# Patient Record
Sex: MALE | Race: BLACK OR AFRICAN AMERICAN | NOT HISPANIC OR LATINO | Employment: UNEMPLOYED | URBAN - METROPOLITAN AREA
[De-identification: names, ages, dates, MRNs, and addresses within clinical notes are randomized per-mention and may not be internally consistent; named-entity substitution may affect disease eponyms.]

---

## 2017-05-28 ENCOUNTER — HOSPITAL ENCOUNTER (EMERGENCY)
Facility: CLINIC | Age: 52
Discharge: HOME | End: 2017-05-28
Attending: PSYCHIATRY & NEUROLOGY | Admitting: PSYCHIATRY & NEUROLOGY
Payer: COMMERCIAL

## 2017-05-28 VITALS
TEMPERATURE: 97.4 F | OXYGEN SATURATION: 98 % | SYSTOLIC BLOOD PRESSURE: 151 MMHG | DIASTOLIC BLOOD PRESSURE: 104 MMHG | HEART RATE: 93 BPM | RESPIRATION RATE: 16 BRPM

## 2017-05-28 DIAGNOSIS — F19.94 OTHER PSYCHOACTIVE SUBSTANCE-INDUCED MOOD DISORDER (H): ICD-10-CM

## 2017-05-28 DIAGNOSIS — F19.20 CHEMICAL DEPENDENCY (H): ICD-10-CM

## 2017-05-28 LAB
AMPHETAMINES UR QL SCN: ABNORMAL
BARBITURATES UR QL: ABNORMAL
BENZODIAZ UR QL: ABNORMAL
CANNABINOIDS UR QL SCN: ABNORMAL
COCAINE UR QL: ABNORMAL
ETHANOL UR QL SCN: ABNORMAL
OPIATES UR QL SCN: ABNORMAL

## 2017-05-28 PROCEDURE — 99283 EMERGENCY DEPT VISIT LOW MDM: CPT | Mod: Z6 | Performed by: PSYCHIATRY & NEUROLOGY

## 2017-05-28 PROCEDURE — 80307 DRUG TEST PRSMV CHEM ANLYZR: CPT | Performed by: FAMILY MEDICINE

## 2017-05-28 PROCEDURE — 99285 EMERGENCY DEPT VISIT HI MDM: CPT | Mod: 25

## 2017-05-28 PROCEDURE — 90791 PSYCH DIAGNOSTIC EVALUATION: CPT

## 2017-05-28 PROCEDURE — 80320 DRUG SCREEN QUANTALCOHOLS: CPT | Performed by: FAMILY MEDICINE

## 2017-05-28 ASSESSMENT — ENCOUNTER SYMPTOMS
CARDIOVASCULAR NEGATIVE: 1
DECREASED CONCENTRATION: 1
HALLUCINATIONS: 0
NEUROLOGICAL NEGATIVE: 1
HYPERACTIVE: 0
GASTROINTESTINAL NEGATIVE: 1
RESPIRATORY NEGATIVE: 1
HEMATOLOGIC/LYMPHATIC NEGATIVE: 1
CONSTITUTIONAL NEGATIVE: 1
ENDOCRINE NEGATIVE: 1
MUSCULOSKELETAL NEGATIVE: 1
SLEEP DISTURBANCE: 1
EYES NEGATIVE: 1

## 2017-05-28 NOTE — DISCHARGE INSTRUCTIONS
You need long-term MI/CD treatment.  Work with your  on getting into a program in [facility]. [facility]  Follow-up established care and services

## 2017-05-28 NOTE — ED AVS SNAPSHOT
[facility], [place], Emergency Department    [address]    [facility] [address]    Phone:  [phone]    Fax:  [phone]                                       Eusebio Morales   MRN: [id number]    Department:  [facility], Emergency Department   Date of Visit:  5/28/2017           After Visit Summary Signature Page     I have received my discharge instructions, and my questions have been answered. I have discussed any challenges I see with this plan with the nurse or doctor.    ..........................................................................................................................................  Patient/Patient Representative Signature      ..........................................................................................................................................  Patient Representative Print Name and Relationship to Patient    ..................................................               ................................................  Date                                            Time    ..........................................................................................................................................  Reviewed by Signature/Title    ...................................................              ..............................................  Date                                                            Time

## 2017-05-28 NOTE — ED NOTES
"Pt says he has been feeling increasingly depressed since this past [place]. Has [facility] and [facility] but stopped taking them b/c \"they don't work\"; has not taken any meds for over 3 months. Pt says he needs narcotics but no one will prescribe them to him, so he street buys percocet or oxy's; last use was 2 weeks ago. Pt states he is having an especially hard time this weekend since it is Memorial day and he is a . Pt used cocaine and alcohol last Friday. Pt says he took the train here today from [place] and he has been walking around all day deciding whether or not to come to ED. Reports SI with \"a lot of ideas.\" Pt is flat, withdrawn, cooperative.   "

## 2017-05-28 NOTE — ED PROVIDER NOTES
History     Chief Complaint   Patient presents with     Suicidal     pt says he is a  and having a hard time this weekend, SI no plan     Depression     Patient is a 51 year old male presenting with Depression:. The history is provided by the patient and medical records.   Depression       Eusebio Morales is a 51 year old male who is here reporting that he feels depressed and suicidal. Patient resides in [facility]. He reports a friend put him on a bus and had him come here. He reports being a  but does not like [facility]. [facility]. He has a  who told him to get help. He reports psychotropic meds don't help him. He stopped taking them 3 months ago. Allegedly his meds are set up for him. He feels narcotics and street drugs help him and he continues to use. His last cocaine use was 2 days ago. He does not feel he has a problem with drugs and does not need an intervention. He dismisses the correlation between drug use and depression. His visits here and subsequent admission have been consistently correlated with a positive drug screen, notably cocaine. Patient has refused to follow-up with treatment recommendations.     PERSONAL MEDICAL HISTORY  Past Medical History:   Diagnosis Date     Anxiety      Gastro-oesophageal reflux disease      Hyperlipidemia      Hypertension      MDD (major depressive disorder)      Polysubstance abuse     Cocaine, Alcohol     PAST SURGICAL HISTORY  Past Surgical History:   Procedure Laterality Date     NO HISTORY OF SURGERY       FAMILY HISTORY  Family History   Problem Relation Age of Onset     Hypertension Mother      Hypertension Father      SOCIAL HISTORY  Social History   Substance Use Topics     Smoking status: Current Every Day Smoker     Packs/day: 0.50     Smokeless tobacco: Not on file     Alcohol use Yes      Comment: occasional use     MEDICATIONS  No current facility-administered medications for this  encounter.      Current Outpatient Prescriptions   Medication     albuterol (PROAIR HFA, PROVENTIL HFA, VENTOLIN HFA) 108 (90 BASE) MCG/ACT inhaler     mirtazapine (REMERON) 30 MG tablet     lisinopril (PRINIVIL,ZESTRIL) 20 MG tablet     QUEtiapine (SEROQUEL) 50 MG tablet     propranolol (INDERAL) 20 MG tablet     nicotine polacrilex (COMMIT) 4 MG lozenge     omeprazole (PRILOSEC OTC) 20 MG tablet     aspirin 81 MG chewable tablet     naproxen (NAPROSYN) 500 MG tablet     hydrOXYzine (ATARAX) 50 MG tablet     traZODone (DESYREL) 100 MG tablet     atorvastatin (LIPITOR) 10 MG tablet     benztropine (COGENTIN) 0.5 MG tablet     cyclobenzaprine (FLEXERIL) 10 MG tablet     QUEtiapine (SEROQUEL) 200 MG tablet     multivitamin, therapeutic with minerals (THERA-VIT-M) TABS     ALLERGIES  No Known Allergies    I have reviewed the Medications, Allergies, Past Medical and Surgical History, and Social History in the Epic system.    Review of Systems   Constitutional: Negative.    HENT: Negative.    Eyes: Negative.    Respiratory: Negative.    Cardiovascular: Negative.    Gastrointestinal: Negative.    Endocrine: Negative.    Genitourinary: Negative.    Musculoskeletal: Negative.    Skin: Negative.    Neurological: Negative.    Hematological: Negative.    Psychiatric/Behavioral: Positive for decreased concentration, sleep disturbance and suicidal ideas. Negative for hallucinations. The patient is not hyperactive.    All other systems reviewed and are negative.      Physical Exam   BP: (!) 151/104  Pulse: 93  Temp: 97.4  F (36.3  C)  Resp: 16  SpO2: 98 %  Physical Exam   Constitutional: He appears well-developed and well-nourished.   Pulmonary/Chest: Effort normal.   Musculoskeletal: Normal range of motion.   Neurological: He is alert.   Psychiatric: His speech is normal. Judgment normal. He is withdrawn. He is not agitated, not aggressive, not hyperactive, not actively hallucinating and not combative. Thought content is not  paranoid and not delusional. Cognition and memory are normal. He exhibits a depressed mood. He expresses no homicidal and no suicidal ideation.   Nursing note and vitals reviewed.      ED Course     ED Course     Procedures      Labs Ordered and Resulted from Time of ED Arrival Up to the Time of Departure from the ED   DRUG ABUSE SCREEN 6 CHEM DEP URINE ([facility]) - Abnormal; Notable for the following:        Result Value    Cocaine Qual Urine   (*)     Value: Positive   Cutoff for a positive cocaine is greater than 300 ng/mL. This is an unconfirmed   screening result to be used for medical purposes only.      All other components within normal limits            Assessments & Plan (with Medical Decision Making)   Patient with history of depression and chemical dependency. Patient has had many hospitalizations here but does not follow-up with recommendations. He refuses to acknowledge a drug problem. I recommended MI/CD day treatment. He was expecting an admission and when he realized he was not getting what he wanted, he asked to leave as we were not willing to help him. Patient was allowed to leave. I recommended MI/CD day treatment programming.    I have reviewed the nursing notes.    I have reviewed the findings, diagnosis, plan and need for follow up with the patient.    New Prescriptions    No medications on file       Final diagnoses:   Chemical dependency (H)   Other psychoactive substance-induced mood disorder (H)       5/28/2017   [facility], [place], EMERGENCY DEPARTMENT     Chris Singh MD  05/28/17 5296

## 2017-05-28 NOTE — ED AVS SNAPSHOT
[facility], Emergency Department    [address]    [place] [address]    Phone:  [phone]    Fax:  [phone]                                       Eusebio Morales   MRN: [id number]    Department:  [facility], Emergency Department   Date of Visit:  5/28/2017           Patient Information     Date Of Birth          1965        Your diagnoses for this visit were:     Chemical dependency (H)     Other psychoactive substance-induced mood disorder (H)        You were seen by Chris Singh MD.      Follow-up Information     Follow up with Bassem Dugan LPN.        Discharge Instructions       You need long-term MI/CD treatment.  Work with your  on getting into a program in [facility]. [facility]  Follow-up [facility] care and services    24 Hour Appointment Hotline       To make an appointment at any [facility], call [phone] ([phone]). If you don't have a family doctor or clinic, we will help you find one. [place] clinics are conveniently located to serve the needs of you and your family.             Review of your medicines      Our records show that you are taking the medicines listed below. If these are incorrect, please call your family doctor or clinic.        Dose / Directions Last dose taken    albuterol 108 (90 BASE) MCG/ACT Inhaler   Commonly known as:  PROAIR HFA/PROVENTIL HFA/VENTOLIN HFA   Dose:  2 puff        Inhale 2 puffs into the lungs every 6 hours as needed for wheezing   Refills:  0        aspirin 81 MG chewable tablet   Dose:  81 mg        Take 1 tablet (81 mg) by mouth daily   Refills:  0        atorvastatin 10 MG tablet   Commonly known as:  LIPITOR        Take 1 tab by mouth every evening   Refills:  0        benztropine 0.5 MG tablet   Commonly known as:  COGENTIN   Dose:  0.5 mg        Take 1 tablet (0.5 mg) by mouth 2 times daily as needed (EPSE)   Refills:  0        cyclobenzaprine 10 MG tablet   Commonly known  as:  FLEXERIL   Dose:  10 mg        Take 1 tablet (10 mg) by mouth nightly as needed for muscle spasms   Refills:  0        hydrOXYzine 50 MG tablet   Commonly known as:  ATARAX   Dose:   mg        Take 1-2 tablets ( mg) by mouth every 4 hours as needed for anxiety   Refills:  0        lisinopril 20 MG tablet   Commonly known as:  PRINIVIL/ZESTRIL   Dose:  20 mg   Quantity:  30 tablet        Take 1 tablet (20 mg) by mouth daily   Refills:  1        mirtazapine 30 MG tablet   Commonly known as:  REMERON   Dose:  30 mg   Quantity:  30 tablet        Take 1 tablet (30 mg) by mouth At Bedtime   Refills:  1        multivitamin, therapeutic with minerals Tabs tablet   Dose:  1 tablet   Quantity:  30 each        Take 1 tablet by mouth daily   Refills:  0        naproxen 500 MG tablet   Commonly known as:  NAPROSYN   Dose:  500 mg        Take 1 tablet (500 mg) by mouth 2 times daily as needed for moderate pain   Refills:  0        nicotine polacrilex 4 MG lozenge   Dose:  4 mg   Quantity:  360 tablet        Place 1 lozenge (4 mg) inside cheek every hour as needed for smoking cessation   Refills:  1        omeprazole 20 MG tablet   Commonly known as:  priLOSEC OTC   Dose:  20 mg   Quantity:  30 tablet        Take 1 tablet (20 mg) by mouth 2 times daily   Refills:  1        propranolol 20 MG tablet   Commonly known as:  INDERAL   Dose:  20 mg   Quantity:  90 tablet        Take 1 tablet (20 mg) by mouth 3 times daily   Refills:  1        * QUEtiapine 200 MG tablet   Commonly known as:  SEROquel   Dose:  200 mg   Quantity:  120 tablet        Take 1 tablet (200 mg) by mouth At Bedtime   Refills:  0        * QUEtiapine 50 MG tablet   Commonly known as:  SEROquel   Dose:   mg   Quantity:  120 tablet        Take 1-2 tablets ( mg) by mouth 3 times daily as needed (anxiety)   Refills:  1        traZODone 100 MG tablet   Commonly known as:  DESYREL   Dose:  100 mg        Take 1 tablet (100 mg) by mouth nightly  "as needed for sleep   Refills:  0        * Notice:  This list has 2 medication(s) that are the same as other medications prescribed for you. Read the directions carefully, and ask your doctor or other care provider to review them with you.            Procedures and tests performed during your visit     Drug abuse screen 6 urine (tox)      Orders Needing Specimen Collection     None      Pending Results     No orders found from 2017 to 2017.            Pending Culture Results     No orders found from 2017 to 2017.            Pending Results Instructions     If you had any lab results that were not finalized at the time of your Discharge, you can call the ED Lab Result RN at [phone]. You will be contacted by this team for any positive Lab results or changes in treatment. The nurses are available 7 days a week from 10A to 6:30P.  You can leave a message 24 hours per day and they will return your call.        Thank you for choosing [facility]       Thank you for choosing [facility] for your care. Our goal is always to provide you with excellent care. Hearing back from our patients is one way we can continue to improve our services. Please take a few minutes to complete the written survey that you may receive in the mail after you visit with us. Thank you!        [employer] Information     [employer] lets you send messages to your doctor, view your test results, renew your prescriptions, schedule appointments and more. To sign up, go to [email or web address] . Click on \"Log in\" on the left side of the screen, which will take you to the Welcome page. Then click on \"Sign up Now\" on the right side of the page.     You will be asked to enter the access code listed below, as well as some personal information. Please follow the directions to create your username and password.     Your access code is: [email or web address]  Expires: 2017  4:31 PM     Your access code will  in 90 days. If you need help or a " new code, please call your [place] clinic or [phone].        Care EveryWhere ID     This is your Care EveryWhere ID. This could be used by other organizations to access your [place] medical records  [id number]        After Visit Summary       This is your record. Keep this with you and show to your community pharmacist(s) and doctor(s) at your next visit.

## 2022-06-20 ENCOUNTER — HOSPITAL ENCOUNTER (INPATIENT)
Facility: CLINIC | Age: 57
LOS: 1 days | Discharge: HOME | DRG: 885 | End: 2022-06-22
Attending: EMERGENCY MEDICINE | Admitting: PSYCHIATRY & NEUROLOGY
Payer: COMMERCIAL

## 2022-06-20 ENCOUNTER — TELEPHONE (OUTPATIENT)
Dept: BEHAVIORAL HEALTH | Facility: CLINIC | Age: 57
End: 2022-06-20

## 2022-06-20 DIAGNOSIS — R52 PAIN: ICD-10-CM

## 2022-06-20 DIAGNOSIS — R45.851 SUICIDAL IDEATION: ICD-10-CM

## 2022-06-20 DIAGNOSIS — F43.21 ADJUSTMENT DISORDER WITH DEPRESSED MOOD: ICD-10-CM

## 2022-06-20 DIAGNOSIS — G47.00 INSOMNIA, UNSPECIFIED TYPE: ICD-10-CM

## 2022-06-20 DIAGNOSIS — I10 ESSENTIAL HYPERTENSION: ICD-10-CM

## 2022-06-20 DIAGNOSIS — R06.02 SHORTNESS OF BREATH: Primary | ICD-10-CM

## 2022-06-20 DIAGNOSIS — F19.90 SUBSTANCE USE DISORDER: ICD-10-CM

## 2022-06-20 DIAGNOSIS — F33.3 SEVERE RECURRENT MAJOR DEPRESSIVE DISORDER WITH PSYCHOTIC FEATURES (H): ICD-10-CM

## 2022-06-20 DIAGNOSIS — F41.9 ANXIETY: ICD-10-CM

## 2022-06-20 DIAGNOSIS — Z11.52 ENCOUNTER FOR SCREENING LABORATORY TESTING FOR SEVERE ACUTE RESPIRATORY SYNDROME CORONAVIRUS 2 (SARS-COV-2): ICD-10-CM

## 2022-06-20 LAB
ALCOHOL BREATH TEST: 0 (ref 0–0.01)
AMPHETAMINES UR QL SCN: ABNORMAL
ATRIAL RATE - MUSE: 122 BPM
BARBITURATES UR QL: ABNORMAL
BENZODIAZ UR QL: ABNORMAL
CANNABINOIDS UR QL SCN: ABNORMAL
COCAINE UR QL: ABNORMAL
DIASTOLIC BLOOD PRESSURE - MUSE: NORMAL MMHG
INTERPRETATION ECG - MUSE: NORMAL
OPIATES UR QL SCN: ABNORMAL
P AXIS - MUSE: 65 DEGREES
PR INTERVAL - MUSE: 126 MS
QRS DURATION - MUSE: 72 MS
QT - MUSE: 336 MS
QTC - MUSE: 478 MS
R AXIS - MUSE: 17 DEGREES
SARS-COV-2 RNA RESP QL NAA+PROBE: NEGATIVE
SYSTOLIC BLOOD PRESSURE - MUSE: NORMAL MMHG
T AXIS - MUSE: 0 DEGREES
VENTRICULAR RATE- MUSE: 122 BPM

## 2022-06-20 PROCEDURE — 93005 ELECTROCARDIOGRAM TRACING: CPT | Performed by: EMERGENCY MEDICINE

## 2022-06-20 PROCEDURE — 99285 EMERGENCY DEPT VISIT HI MDM: CPT | Mod: 25 | Performed by: EMERGENCY MEDICINE

## 2022-06-20 PROCEDURE — 250N000013 HC RX MED GY IP 250 OP 250 PS 637: Performed by: EMERGENCY MEDICINE

## 2022-06-20 PROCEDURE — C9803 HOPD COVID-19 SPEC COLLECT: HCPCS | Performed by: EMERGENCY MEDICINE

## 2022-06-20 PROCEDURE — 80307 DRUG TEST PRSMV CHEM ANLYZR: CPT | Performed by: EMERGENCY MEDICINE

## 2022-06-20 PROCEDURE — 93010 ELECTROCARDIOGRAM REPORT: CPT | Performed by: EMERGENCY MEDICINE

## 2022-06-20 PROCEDURE — 87635 SARS-COV-2 COVID-19 AMP PRB: CPT | Performed by: FAMILY MEDICINE

## 2022-06-20 PROCEDURE — 82075 ASSAY OF BREATH ETHANOL: CPT | Performed by: EMERGENCY MEDICINE

## 2022-06-20 PROCEDURE — 90791 PSYCH DIAGNOSTIC EVALUATION: CPT

## 2022-06-20 RX ORDER — HYDROCHLOROTHIAZIDE 25 MG/1
25 TABLET ORAL DAILY
Status: DISCONTINUED | OUTPATIENT
Start: 2022-06-20 | End: 2022-06-22 | Stop reason: HOSPADM

## 2022-06-20 RX ORDER — HYDROXYZINE HYDROCHLORIDE 50 MG/1
50 TABLET, FILM COATED ORAL ONCE
Status: COMPLETED | OUTPATIENT
Start: 2022-06-20 | End: 2022-06-20

## 2022-06-20 RX ADMIN — HYDROXYZINE HYDROCHLORIDE 50 MG: 50 TABLET, FILM COATED ORAL at 04:36

## 2022-06-20 RX ADMIN — HYDROCHLOROTHIAZIDE 25 MG: 25 TABLET ORAL at 09:22

## 2022-06-20 NOTE — ED NOTES
"The following information was received from Kris Clark whose relationship to the patient is mother. Information was obtained via phone. Their phone number is [phone] and they last had contact with patient last week.    What happened today: \"We don't know.  We didn't hear from him and it was Father's Day so we wondered what happened.  He didn't answer his phone and his sister tried to call him, too.\"    What is different about patient's functioning: \"It's not like him to not call.  Most of the time he calls to check on us.  He was calling more, since he was his normal self.  He said that him and his girl were taking some space from each other.  He was excited because he planned to go back to school.\"    Concern about alcohol/drug use: No, parents had no knowledge of his recent use.    What do you think the patient needs: unsure    Has patient made comments about wanting to kill themselves/others:  No    If d/c is recommended, can they take part in safety/aftercare planning: No.  They live in [place].    Other information: Patient's mother asked to have him charge his phone so she and his sister can reach him.  His sister tried to get a hold of him.  The ED MD stated that the patient felt really bad that he didn't reach his dad on Father's day and that his dad is 84 years old.        "

## 2022-06-20 NOTE — ED NOTES
[facility] ED Mental Health Handoff Note:       Brief HPI:  This is a 56 year old male signed out to me by Dr. Hankins.  See initial ED Provider note for full details of the presentation. Interval history is pertinent for SI with CD.    Home meds reviewed and ordered/administered: Yes    Medically stable for inpatient mental health admission: Yes.    Evaluated by mental health: Yes. The recommendation is for inpatient mental health treatment. Bed search in process    Safety concerns: At the time I received sign out, there were no safety concerns.    Hold Status:  Active Orders   N/A            Exam:   Patient Vitals for the past 24 hrs:   BP Temp Temp src Pulse Resp SpO2 Height Weight   06/20/22 0344 (!) 158/104 98.7  F (37.1  C) Oral 118 20 97 % 1.829 m (6') 104.3 kg (230 lb)           ED Course:    Medications   hydrochlorothiazide (HYDRODIURIL) tablet 25 mg (has no administration in time range)   hydrOXYzine (ATARAX) tablet 50 mg (50 mg Oral Given 6/20/22 0436)            There were no significant events during my shift.    Patient was signed out to the oncoming provider, Dr. Garcia.      Impression:    ICD-10-CM    1. Suicidal ideation  R45.851    2. Adjustment disorder with depressed mood  F43.21    3. Substance use disorder  F19.90        Plan:    1. Awaiting inpatient mental health admission/transfer.      RESULTS:   Results for orders placed or performed during the hospital encounter of 06/20/22 (from the past 24 hour(s))   EKG 12 lead     Status: None    Collection Time: 06/20/22  4:26 AM   Result Value Ref Range    Systolic Blood Pressure  mmHg    Diastolic Blood Pressure  mmHg    Ventricular Rate 122 BPM    Atrial Rate 122 BPM    TN Interval 126 ms    QRS Duration 72 ms     ms    QTc 478 ms    P Axis 65 degrees    R AXIS 17 degrees    T Axis 0 degrees    Interpretation ECG       Sinus tachycardia  Possible Left atrial enlargement  Nonspecific ST abnormality  Abnormal ECG  Unconfirmed  report - interpretation of this ECG is computer generated - see medical record for final interpretation  Confirmed by [place]  ECG READING LIST, : ([id number]),  KAREN MEDELLIN ([id number]) on 6/20/2022 7:02:05 AM     Alcohol breath test POCT     Status: Normal    Collection Time: 06/20/22  4:48 AM   Result Value Ref Range    Alcohol Breath Test 0.00 0.00 - 0.01   Urine Drugs of Abuse Screen     Status: None (In process)    Collection Time: 06/20/22  7:54 AM    Narrative    The following orders were created for panel order Urine Drugs of Abuse Screen.  Procedure                               Abnormality         Status                     ---------                               -----------         ------                     Drug abuse screen 1 urin...[[id number]]                      In process                   Please view results for these tests on the individual orders.             William Wilson MD    This note was created at least in part by the use of dragon voice dictation system. Inadvertent typographical errors may still exist.  William Wilson MD.    Patient evaluated in the emergency department during the COVID-19 pandemic period. Careful attention to patients safety was addressed throughout the evaluation. Evaluation and treatment management was initiated with disposition made efficiently and appropriate as possible to minimize any risk of potential exposure to patient during this evaluation.                       William Wilson MD  06/20/22 6440

## 2022-06-20 NOTE — ED TRIAGE NOTES
[facility]  [place].  Pt recently relapsed on drugs, had been sober since August 2021.  Pt recently broke up with SO and forgot to call his father today for father's day.  Pt stated he feels really depressed and hopeless.     Triage Assessment     Row Name 06/20/22 0400       Triage Assessment (Adult)    Airway WDL WDL       Respiratory WDL    Respiratory WDL WDL       Skin Circulation/Temperature WDL    Skin Circulation/Temperature WDL WDL       Cardiac WDL    Cardiac WDL X;rhythm    Cardiac Rhythm tachycardic  Pt used meth and cocaine prior to arrival       Peripheral/Neurovascular WDL    Peripheral Neurovascular WDL WDL       Cognitive/Neuro/Behavioral WDL    Cognitive/Neuro/Behavioral WDL X;mood/behavior    Mood/Behavior anxious
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
## 2022-06-20 NOTE — ED NOTES
Bed: HW04  Expected date:   Expected time:   Means of arrival:   Comments:  Rosalino Beaulieu [address] 55 y/o M EVANGELINA dykes

## 2022-06-20 NOTE — ED NOTES
Pt stated he smoked crack cocaine earlier tonight and thinks he did methamphentamine as well, Pt is not entirely sure about the meth.

## 2022-06-21 PROBLEM — F19.90 SUBSTANCE USE DISORDER: Status: ACTIVE | Noted: 2022-06-21

## 2022-06-21 PROBLEM — F43.21 ADJUSTMENT DISORDER WITH DEPRESSED MOOD: Status: ACTIVE | Noted: 2022-06-21

## 2022-06-21 PROCEDURE — 250N000013 HC RX MED GY IP 250 OP 250 PS 637: Performed by: PSYCHIATRY & NEUROLOGY

## 2022-06-21 PROCEDURE — 124N000002 HC R&B MH UMMC

## 2022-06-21 PROCEDURE — 250N000013 HC RX MED GY IP 250 OP 250 PS 637: Performed by: EMERGENCY MEDICINE

## 2022-06-21 PROCEDURE — 99222 1ST HOSP IP/OBS MODERATE 55: CPT | Mod: AI | Performed by: PSYCHIATRY & NEUROLOGY

## 2022-06-21 RX ORDER — HYDROCHLOROTHIAZIDE 12.5 MG/1
12.5 TABLET ORAL DAILY
Status: DISCONTINUED | OUTPATIENT
Start: 2022-06-21 | End: 2022-06-21

## 2022-06-21 RX ORDER — HYDROXYZINE HYDROCHLORIDE 25 MG/1
25 TABLET, FILM COATED ORAL EVERY 4 HOURS PRN
Status: DISCONTINUED | OUTPATIENT
Start: 2022-06-21 | End: 2022-06-22 | Stop reason: HOSPADM

## 2022-06-21 RX ORDER — CYCLOBENZAPRINE HCL 10 MG
10 TABLET ORAL
Status: DISCONTINUED | OUTPATIENT
Start: 2022-06-21 | End: 2022-06-22 | Stop reason: HOSPADM

## 2022-06-21 RX ORDER — ZOLPIDEM TARTRATE 10 MG/1
10 TABLET ORAL AT BEDTIME
Status: DISCONTINUED | OUTPATIENT
Start: 2022-06-21 | End: 2022-06-22 | Stop reason: HOSPADM

## 2022-06-21 RX ORDER — MULTIPLE VITAMINS W/ MINERALS TAB 9MG-400MCG
1 TAB ORAL DAILY
Status: DISCONTINUED | OUTPATIENT
Start: 2022-06-21 | End: 2022-06-22 | Stop reason: HOSPADM

## 2022-06-21 RX ORDER — OLANZAPINE 10 MG/1
10 TABLET ORAL 3 TIMES DAILY PRN
Status: DISCONTINUED | OUTPATIENT
Start: 2022-06-21 | End: 2022-06-22 | Stop reason: HOSPADM

## 2022-06-21 RX ORDER — ALBUTEROL SULFATE 90 UG/1
2 AEROSOL, METERED RESPIRATORY (INHALATION) EVERY 6 HOURS PRN
Status: DISCONTINUED | OUTPATIENT
Start: 2022-06-21 | End: 2022-06-22 | Stop reason: HOSPADM

## 2022-06-21 RX ORDER — OXYCODONE HYDROCHLORIDE 10 MG/1
10 TABLET ORAL EVERY 8 HOURS PRN
Status: ON HOLD | COMMUNITY
End: 2022-06-22

## 2022-06-21 RX ORDER — ASPIRIN 81 MG/1
81 TABLET, CHEWABLE ORAL DAILY
Status: DISCONTINUED | OUTPATIENT
Start: 2022-06-21 | End: 2022-06-22 | Stop reason: HOSPADM

## 2022-06-21 RX ORDER — GABAPENTIN 400 MG/1
800 CAPSULE ORAL 3 TIMES DAILY
Status: DISCONTINUED | OUTPATIENT
Start: 2022-06-21 | End: 2022-06-21

## 2022-06-21 RX ORDER — LIDOCAINE 4 G/G
1-3 PATCH TOPICAL EVERY 24 HOURS
COMMUNITY

## 2022-06-21 RX ORDER — CYCLOBENZAPRINE HCL 10 MG
10 TABLET ORAL
COMMUNITY

## 2022-06-21 RX ORDER — MAGNESIUM HYDROXIDE/ALUMINUM HYDROXICE/SIMETHICONE 120; 1200; 1200 MG/30ML; MG/30ML; MG/30ML
30 SUSPENSION ORAL EVERY 4 HOURS PRN
Status: DISCONTINUED | OUTPATIENT
Start: 2022-06-21 | End: 2022-06-22 | Stop reason: HOSPADM

## 2022-06-21 RX ORDER — BUPROPION HYDROCHLORIDE 100 MG/1
100 TABLET, EXTENDED RELEASE ORAL DAILY
Status: DISCONTINUED | OUTPATIENT
Start: 2022-06-21 | End: 2022-06-22 | Stop reason: HOSPADM

## 2022-06-21 RX ORDER — OLANZAPINE 10 MG/2ML
10 INJECTION, POWDER, FOR SOLUTION INTRAMUSCULAR 3 TIMES DAILY PRN
Status: DISCONTINUED | OUTPATIENT
Start: 2022-06-21 | End: 2022-06-22 | Stop reason: HOSPADM

## 2022-06-21 RX ORDER — GABAPENTIN 400 MG/1
800 CAPSULE ORAL 3 TIMES DAILY PRN
Status: DISCONTINUED | OUTPATIENT
Start: 2022-06-21 | End: 2022-06-22 | Stop reason: HOSPADM

## 2022-06-21 RX ORDER — LIDOCAINE 4 G/G
1-3 PATCH TOPICAL EVERY 24 HOURS
Status: DISCONTINUED | OUTPATIENT
Start: 2022-06-21 | End: 2022-06-22 | Stop reason: HOSPADM

## 2022-06-21 RX ORDER — AMOXICILLIN 250 MG
1 CAPSULE ORAL 2 TIMES DAILY PRN
Status: DISCONTINUED | OUTPATIENT
Start: 2022-06-21 | End: 2022-06-22 | Stop reason: HOSPADM

## 2022-06-21 RX ORDER — FLUTICASONE PROPIONATE AND SALMETEROL 250; 50 UG/1; UG/1
1 POWDER RESPIRATORY (INHALATION) 2 TIMES DAILY
Status: ON HOLD | COMMUNITY
End: 2022-06-22

## 2022-06-21 RX ORDER — HYDROCHLOROTHIAZIDE 12.5 MG/1
12.5 TABLET ORAL DAILY
Status: ON HOLD | COMMUNITY
End: 2022-06-22

## 2022-06-21 RX ADMIN — BUPROPION HYDROCHLORIDE 100 MG: 100 TABLET, EXTENDED RELEASE ORAL at 15:42

## 2022-06-21 RX ADMIN — MULTIPLE VITAMINS W/ MINERALS TAB 1 TABLET: TAB at 15:42

## 2022-06-21 RX ADMIN — QUETIAPINE 300 MG: 200 TABLET, FILM COATED ORAL at 20:11

## 2022-06-21 RX ADMIN — HYDROCHLOROTHIAZIDE 25 MG: 25 TABLET ORAL at 07:50

## 2022-06-21 RX ADMIN — ASPIRIN 81 MG CHEWABLE TABLET 81 MG: 81 TABLET CHEWABLE at 15:42

## 2022-06-21 RX ADMIN — ZOLPIDEM TARTRATE 10 MG: 10 TABLET, FILM COATED ORAL at 20:11

## 2022-06-21 RX ADMIN — LIDOCAINE PATCH 4% 1 PATCH: 40 PATCH TOPICAL at 20:11

## 2022-06-21 RX ADMIN — FLUTICASONE FUROATE AND VILANTEROL TRIFENATATE 1 PUFF: 100; 25 POWDER RESPIRATORY (INHALATION) at 15:42

## 2022-06-21 RX ADMIN — GABAPENTIN 800 MG: 400 CAPSULE ORAL at 20:11

## 2022-06-21 ASSESSMENT — ACTIVITIES OF DAILY LIVING (ADL)
HEARING_DIFFICULTY_OR_DEAF: NO
VISION_MANAGEMENT: GLASSES
WALKING_OR_CLIMBING_STAIRS_DIFFICULTY: NO
DIFFICULTY_EATING/SWALLOWING: NO
FALL_HISTORY_WITHIN_LAST_SIX_MONTHS: NO
CONCENTRATING,_REMEMBERING_OR_MAKING_DECISIONS_DIFFICULTY: NO
WEAR_GLASSES_OR_BLIND: YES
DIFFICULTY_COMMUNICATING: NO
TOILETING_ISSUES: NO
DRESSING/BATHING_DIFFICULTY: NO
CHANGE_IN_FUNCTIONAL_STATUS_SINCE_ONSET_OF_CURRENT_ILLNESS/INJURY: NO
DOING_ERRANDS_INDEPENDENTLY_DIFFICULTY: NO

## 2022-06-21 ASSESSMENT — LIFESTYLE VARIABLES
AUDIT-C TOTAL SCORE: 11
SKIP TO QUESTIONS 9-10: 0

## 2022-06-21 NOTE — TELEPHONE ENCOUNTER
R: 10PM Bed Search update: [facility] only    [facility] is at capacity.    Pt remains on waitlist pending available bed.
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
## 2022-06-21 NOTE — PLAN OF CARE
06/21/22 2387   Patient Belongings   Did you bring any home meds/supplements to the hospital?  No   Patient Belongings locker   Patient Belongings Remaining with Patient cash/credit card;cell phone/electronics;clothing;shoes;wallet;plastic bag   Belongings Search Yes   Clothing Search Yes   Second Staff Enrrique (RN)   Comment SeeNote   Goal Outcome Evaluation:  In locker; 1 cell phone and , 1 watch, 1 set of keys, 1 belt. 1 body shirt, 1 long sleeve shirt, 1 short, 1 hat and wallet   Sent to security: EBT card #[id number] and Master card #[id number]  A               Admission:  I am responsible for any personal items that are not sent to the safe or pharmacy.  [facility] is not responsible for loss, theft or damage of any property in my possession.    Signature:  _________________________________ Date: _______  Time: _____                                              Staff Signature:  ____________________________ Date: ________  Time: _____      2nd Staff person, if patient is unable/unwilling to sign:    Signature: ________________________________ Date: ________  Time: _____     Discharge:  [facility] has returned all of my personal belongings:    Signature: _________________________________ Date: ________  Time: _____                                          Staff Signature:  ____________________________ Date: ________  Time: _____

## 2022-06-21 NOTE — CONSULTS
Name: Eusebio Morales    : 1965  Date: 2022     Time: 6:26 AM ET  Location of patient: [facility]  Location of doctor: [facility]    Spoke with: Roxana    HPI: Patient with prior psychiatric hospitalization for mood disorder and substance abuse and nonadherent complained of depression, suicide with a plan to jump into the [facility], and auditory and visual hallucinations. Patient reported medically stable. Breathalyzer 0. UDS positive for amphetamines and cocaine. COVID-19 negative. Patient appears to be psychotic, incapable of self-care, and unmanageable at home or in an outpatient treatment setting. Patient appears to require hospitalization for safety and treatment.  Plan: Voluntary admission for safety and treatment.
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
## 2022-06-21 NOTE — PROGRESS NOTES
SPIRITUAL HEALTH SERVICES  SPIRITUAL ASSESSMENT Progress Note  [facility] ([facility]) Station 30     REFERRAL SOURCE: I did try to visit patient Eusebio per [facility] hospital  visit. However, pt is new for the unit and he was not available at this moment and busy for one to one  visit. I informed the unit the staff about my visit attempts.    PLAN: I will remain open to provide spiritual care for the pt as needed.    Nellie Edwards M.Div. ([place]), M.Th., D.Min., [facility]  Staff   Pager [phone]

## 2022-06-21 NOTE — ED NOTES
Called to give report to station 30 and [facility] states charge is on break and will return my call.

## 2022-06-22 VITALS
SYSTOLIC BLOOD PRESSURE: 163 MMHG | HEART RATE: 110 BPM | OXYGEN SATURATION: 97 % | DIASTOLIC BLOOD PRESSURE: 102 MMHG | BODY MASS INDEX: 29.32 KG/M2 | TEMPERATURE: 97.6 F | RESPIRATION RATE: 18 BRPM | HEIGHT: 72 IN | WEIGHT: 216.5 LBS

## 2022-06-22 PROCEDURE — 250N000013 HC RX MED GY IP 250 OP 250 PS 637: Performed by: PSYCHIATRY & NEUROLOGY

## 2022-06-22 PROCEDURE — 250N000013 HC RX MED GY IP 250 OP 250 PS 637: Performed by: EMERGENCY MEDICINE

## 2022-06-22 PROCEDURE — 99239 HOSP IP/OBS DSCHRG MGMT >30: CPT | Performed by: PSYCHIATRY & NEUROLOGY

## 2022-06-22 RX ORDER — FLUTICASONE PROPIONATE AND SALMETEROL 250; 50 UG/1; UG/1
1 POWDER RESPIRATORY (INHALATION) 2 TIMES DAILY
Qty: 60 EACH | Refills: 0 | Status: SHIPPED | OUTPATIENT
Start: 2022-06-22

## 2022-06-22 RX ORDER — BUPROPION HYDROCHLORIDE 100 MG/1
100 TABLET, EXTENDED RELEASE ORAL DAILY
Qty: 30 TABLET | Refills: 0 | Status: SHIPPED | OUTPATIENT
Start: 2022-06-23

## 2022-06-22 RX ORDER — ZOLPIDEM TARTRATE 10 MG/1
10 TABLET ORAL AT BEDTIME
Qty: 30 TABLET | Refills: 0 | Status: SHIPPED | OUTPATIENT
Start: 2022-06-22

## 2022-06-22 RX ORDER — IBUPROFEN 800 MG/1
800 TABLET, FILM COATED ORAL 3 TIMES DAILY PRN
Qty: 60 TABLET | Refills: 0 | Status: SHIPPED | OUTPATIENT
Start: 2022-06-22

## 2022-06-22 RX ORDER — QUETIAPINE FUMARATE 300 MG/1
300 TABLET, FILM COATED ORAL AT BEDTIME
Qty: 30 TABLET | Refills: 0 | Status: SHIPPED | OUTPATIENT
Start: 2022-06-22

## 2022-06-22 RX ORDER — GABAPENTIN 400 MG/1
800 CAPSULE ORAL 3 TIMES DAILY PRN
Qty: 180 CAPSULE | Refills: 0 | Status: SHIPPED | OUTPATIENT
Start: 2022-06-22

## 2022-06-22 RX ORDER — HYDROCHLOROTHIAZIDE 25 MG/1
25 TABLET ORAL DAILY
Qty: 30 TABLET | Refills: 0 | Status: SHIPPED | OUTPATIENT
Start: 2022-06-23

## 2022-06-22 RX ADMIN — HYDROCHLOROTHIAZIDE 25 MG: 25 TABLET ORAL at 08:51

## 2022-06-22 RX ADMIN — MULTIPLE VITAMINS W/ MINERALS TAB 1 TABLET: TAB at 08:51

## 2022-06-22 RX ADMIN — BUPROPION HYDROCHLORIDE 100 MG: 100 TABLET, EXTENDED RELEASE ORAL at 08:50

## 2022-06-22 RX ADMIN — ASPIRIN 81 MG CHEWABLE TABLET 81 MG: 81 TABLET CHEWABLE at 08:51

## 2022-06-22 RX ADMIN — FLUTICASONE FUROATE AND VILANTEROL TRIFENATATE 1 PUFF: 100; 25 POWDER RESPIRATORY (INHALATION) at 08:51

## 2022-06-22 RX ADMIN — HYDROXYZINE HYDROCHLORIDE 25 MG: 25 TABLET, FILM COATED ORAL at 10:30

## 2022-06-22 ASSESSMENT — ACTIVITIES OF DAILY LIVING (ADL)
HYGIENE/GROOMING: INDEPENDENT
ORAL_HYGIENE: INDEPENDENT
DRESS: SCRUBS (BEHAVIORAL HEALTH)

## 2022-06-22 NOTE — PLAN OF CARE
"Night Shift Summary (6/21/22 into 06/22/22)    Pt in bed sleeping at start of shift. Breathing quiet and unlabored. Out once inquiring about a snack. Writer listed off all available foods and pt just shook his head \"no\" and returned to his room. Has been fasting, therefore, since midnight for labs this morning. Appears to have slept 7 hours.    Pt has Suicide and Withdrawal precaution order(s); any related events noted above.     Will continue to monitor and assess.       Problem: Behavioral Health Plan of Care  Goal: Absence of New-Onset Illness or Injury  Outcome: Ongoing, Progressing     Problem: Sleep Disturbance  Goal: Adequate Sleep/Rest  Outcome: Ongoing, Progressing     "

## 2022-06-22 NOTE — PLAN OF CARE
Initial Psychosocial Assessment    I have reviewed the chart, met with the patient only briefly as he immediately asked for discharge    Information taken from DEC assessment.    DEC talked to:  Kris Clark- mother.   Their phone number is [phone]     Presenting Problem:  Pt relapsed on alcohol and drugs and had suicidal thoughts of jumping in the river.    History of Mental Health and Chemical Dependency:    Patient has a long history of mental health and substance abuse. He has had many mental health admissions, the most recent in 8/2021 at the [facility]. He also had his last CD treatment at that time. He had a suicidal attempt by overdose several years ago. He has had 20+ CD treatments. He does not have any mental health providers in place at this time. Per chart he [facility] but states that he no longer has one.     Family Description (Constellation, Family Psychiatric History):    Patient was born in [place] where his parents live. He has one sister. He had one previous marriage which was anauled. He has two adult sons, one in [place] and one in [place]. He has been in a relationship for five years which recent ended. In that relationship he has a 3 year old daughter who was adopted by her maternal grandmother. He also has a 1 1/2 year old son adopted by his maternal aunt. They will not allow patient to see them, he states it has been two years since he saw them.     Significant Life Events (Illness, Abuse, Trauma, Death):        Living Situation:  Pt lives in a [facility] in [place].    Educational Background:  Pt recently started college at [employer].      Occupational History:  Pt is unemployed. He must be on disability income since he qualfies for medicare     Financial Status:  Human Medicare Advantage  Pt has EBT card in [place]    Legal Issues:  None known      Ethnic/Cultural Issues:  None known    Spiritual Orientation:  None known      Service History:  Patient was in the [employer] from 1983 - 1990. He is service connected.    Social Functioning (organization, interests):  Unknown    Current Treatment Providers are:    [facility].  has used the [facility] in [place] in the past for his medical, mental health and substance use. [facility] for mental health and chemical dependency. He has a primary care provider through [facility] where he currently gets his care. He prefers not to go to a VA hospital.     Primary Care Provider:    Heather Adair PA-C    [facility]] Internal Medicine    [address] [place] [address]    [phone]      Social Service Assessment/Plan:    I was informed that the pt was being discharged.  Upon approach the pt was in bed right before lunch.  He declined any assistance with health care appointments of any kind.    When asked how he would get home he asked for a [facility] funded cab.    Pt displayed irritability at the desk at 1PM asking to leave immediately.

## 2022-06-22 NOTE — PROGRESS NOTES
"Pt. Impatiently waiting to be discharged,  started shouting \"I'm tired of this Bull#!  I was told I would be out of her at 1:00PM\" started pacing the hallway. Denies pain and all other psych symptoms, contracted for safety. Pick-up by [employer] cab @0858 sent alona all his belongings, including medications and AVS paperwork. Pt. Verbalizes the understanding of scheduled medications. Pt. Discharge to home.    "

## 2022-06-22 NOTE — DISCHARGE SUMMARY
Discharge orders are received.  Reviewed and discussed discharge instructions, follow up care, future appointments and medication administration.  Patient denies thoughts of SI, SIB or hallucinations. Pt. Contracts for safety  Verbalized understanding of discharge instructions. Received personal belongings.  All forms are signed, picked up @1607 by [facility], discharge to home.    BP (!) 163/102   Pulse 110   Temp 97.6  F (36.4  C) (Temporal)   Resp 18   Ht 1.829 m (6')   Wt 98.2 kg (216 lb 8 oz)   SpO2 97%   BMI 29.36 kg/m
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
## 2022-06-22 NOTE — DISCHARGE INSTRUCTIONS
Behavioral Discharge Planning and Instructions    Summary: You were admitted on 6/20/2022  due to Anxiety, Mood Disorder NOS, and Chemical Use Issues.  You were treated by Dr. Freeman and discharged on 6/22/22 from [facility] to Home. You requested that this hospital provide you with a cab for transportation home.    Main Diagnosis:   1.  Major depressive disorder, recurrent, severe, with possible psychotic features.    2.  Polysubstance use disorder.      Health Care Follow-up:   You have a Human Medicare Advantage Plan.      You declined any assistance with arranging for mental health providers to continue your care on an outpatient basis.      Attend all scheduled appointments with your outpatient providers. Call at least 24 hours in advance if you need to reschedule an appointment to ensure continued access to your outpatient providers.     Major Treatments, Procedures and Findings:  You were provided with: a psychiatric assessment, assessed for medical stability, medication evaluation and/or management, group therapy, milieu management, and medical interventions    Symptoms to Report: losing more sleep, mood getting worse, or thoughts of suicide    Early warning signs can include: increased thoughts or behaviors of suicide or self-harm     Safety and Wellness:  Take all medicines as directed.  Make no changes unless your doctor suggests them.      Follow treatment recommendations.  Refrain from alcohol and non-prescribed drugs.  If there is a concern for safety, call 911.    Resources:   National [place] on Mental Illness (www.[place].thad[place]us.org): 586.635.7789 or 499-925-2168.  Alcoholics Anonymous (www.alcoholics-anonymous.org): Check your phone book for your local chapter.    [facility] Mental [facility] Crisis Response   [place]: [phone]    In the [facility], call **CRISIS (414947) from a cell phone to talk to a team of professionals who can help you.  Crisis Text Line: Text [place] to 101725      Talk  to them about a crisis home if you feel you need this.  Here is one close to you.  [facility] - Crisis Bed    [employer]  [facility]  [address] [place] [id number]   [phone]      [employer] [facility]: [phone]       General Medication Instructions:   See your medication sheet(s) for instructions.   Take all medicines as directed.  Make no changes unless your doctor suggests them.   Go to all your doctor visits.  Be sure to have all your required lab tests. This way, your medicines can be refilled on time.  Do not use any drugs not prescribed by your doctor.  Avoid alcohol.    Advance Directives:   Scanned document on file with [facility]? No scanned doc  Is document scanned? Pt states no documents  Honoring Choices Your Rights Handout: Informed and given  Was more information offered? Materials given    The Treatment team has appreciated the opportunity to work with you. If you have any questions or concerns about your recent admission, you can contact the unit which can receive your call 24 hours a day, 7 days a week. They will be able to get in touch with a Provider if needed. The unit number is [phone] .

## 2022-06-22 NOTE — PLAN OF CARE
06/22/22 1533   Individualization/Patient Specific Goals   Patient Personal Strengths family/social support;intellectual cognitive skills   Patient Vulnerabilities substance abuse/addiction   Anxieties, Fears or Concerns wants immediate discharge   Individualized Care Needs Medication Management, Recovery supports   Patient-Specific Goals (Include Timeframe) Return home, ensure placement is secure   Interprofessional Rounds   Summary Pt hailed EMS to bring him to the hospital as he had thoughts of going to jump in the river in the context of alcohol and drug relapse.   Participants CTC;psychiatrist;nursing;OT   Team Discussion   Participants Dr. Freeman, Khushi COLLINS, Anna Marie Key OT, Tiki RN   Progress Pt wants immediate discharge.   Anticipated length of stay 1 day   Continued Stay Criteria/Rationale na   Medical/Physical no actice issues were discussed   Precautions withdrawal, suicide   Plan Return home, Multidiscplinary evaluation, Assess readiness for discharge   Rationale for change in precautions or plan na   Safety Plan 15 minute checks   Anticipated Discharge Disposition home or self-care

## 2022-06-23 ENCOUNTER — PATIENT OUTREACH (OUTPATIENT)
Dept: CARE COORDINATION | Facility: CLINIC | Age: 57
End: 2022-06-23

## 2022-06-23 DIAGNOSIS — Z71.89 OTHER SPECIFIED COUNSELING: ICD-10-CM
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
